# Patient Record
Sex: FEMALE | HISPANIC OR LATINO | Employment: UNEMPLOYED
[De-identification: names, ages, dates, MRNs, and addresses within clinical notes are randomized per-mention and may not be internally consistent; named-entity substitution may affect disease eponyms.]

---

## 2017-06-08 ENCOUNTER — RECORDS - HEALTHEAST (OUTPATIENT)
Dept: ADMINISTRATIVE | Facility: OTHER | Age: 42
End: 2017-06-08

## 2017-10-04 ENCOUNTER — COMMUNICATION - HEALTHEAST (OUTPATIENT)
Dept: NEUROSURGERY | Facility: CLINIC | Age: 42
End: 2017-10-04

## 2017-10-05 ENCOUNTER — RECORDS - HEALTHEAST (OUTPATIENT)
Dept: ADMINISTRATIVE | Facility: OTHER | Age: 42
End: 2017-10-05

## 2017-10-05 ENCOUNTER — AMBULATORY - HEALTHEAST (OUTPATIENT)
Dept: NEUROSURGERY | Facility: CLINIC | Age: 42
End: 2017-10-05

## 2017-10-06 ENCOUNTER — OFFICE VISIT - HEALTHEAST (OUTPATIENT)
Dept: NEUROSURGERY | Facility: CLINIC | Age: 42
End: 2017-10-06

## 2017-10-06 DIAGNOSIS — R90.0 INTRACRANIAL MASS: ICD-10-CM

## 2017-10-06 RX ORDER — IBUPROFEN 600 MG/1
600 TABLET, FILM COATED ORAL EVERY 6 HOURS PRN
Status: SHIPPED | COMMUNITY
Start: 2017-10-06

## 2017-10-06 RX ORDER — ACETAMINOPHEN 500 MG
500 TABLET ORAL EVERY 6 HOURS PRN
Status: SHIPPED | COMMUNITY
Start: 2017-10-06

## 2017-10-06 RX ORDER — SUMATRIPTAN 50 MG/1
50 TABLET, FILM COATED ORAL
Status: SHIPPED | COMMUNITY
Start: 2017-10-06

## 2017-10-06 RX ORDER — CHLORAL HYDRATE 500 MG
2 CAPSULE ORAL 2 TIMES DAILY
Status: SHIPPED | COMMUNITY
Start: 2017-10-06

## 2017-10-06 RX ORDER — FOLIC ACID 1 MG/1
1 TABLET ORAL DAILY
Status: SHIPPED | COMMUNITY
Start: 2017-10-06

## 2017-10-06 RX ORDER — VALACYCLOVIR HYDROCHLORIDE 1 G/1
1000 TABLET, FILM COATED ORAL DAILY
Status: SHIPPED | COMMUNITY
Start: 2017-10-06

## 2017-10-06 RX ORDER — LEVOTHYROXINE SODIUM 125 UG/1
125 TABLET ORAL DAILY
Status: SHIPPED | COMMUNITY
Start: 2017-10-06

## 2017-10-06 ASSESSMENT — MIFFLIN-ST. JEOR: SCORE: 1402.8

## 2017-10-30 ENCOUNTER — RECORDS - HEALTHEAST (OUTPATIENT)
Dept: ADMINISTRATIVE | Facility: OTHER | Age: 42
End: 2017-10-30

## 2017-10-30 ENCOUNTER — HOSPITAL ENCOUNTER (OUTPATIENT)
Dept: MRI IMAGING | Facility: CLINIC | Age: 42
Discharge: HOME | End: 2017-10-30
Attending: NEUROLOGICAL SURGERY

## 2017-10-30 DIAGNOSIS — R90.0 INTRACRANIAL MASS: ICD-10-CM

## 2017-11-16 ENCOUNTER — OFFICE VISIT - HEALTHEAST (OUTPATIENT)
Dept: NEUROSURGERY | Facility: CLINIC | Age: 42
End: 2017-11-16

## 2017-11-16 DIAGNOSIS — D32.9 MENINGIOMA (H): ICD-10-CM

## 2017-11-16 DIAGNOSIS — G93.9 RIGHT PARIETAL LOBE LESION: ICD-10-CM

## 2017-11-29 ENCOUNTER — COMMUNICATION - HEALTHEAST (OUTPATIENT)
Dept: NEUROSURGERY | Facility: CLINIC | Age: 42
End: 2017-11-29

## 2018-05-10 ENCOUNTER — HOSPITAL ENCOUNTER (OUTPATIENT)
Dept: MRI IMAGING | Facility: CLINIC | Age: 43
Discharge: HOME | End: 2018-05-10
Attending: NEUROLOGICAL SURGERY

## 2018-05-10 DIAGNOSIS — D32.9 MENINGIOMA (H): ICD-10-CM

## 2018-05-10 DIAGNOSIS — G93.9 RIGHT PARIETAL LOBE LESION: ICD-10-CM

## 2018-07-09 ENCOUNTER — OFFICE VISIT - HEALTHEAST (OUTPATIENT)
Dept: NEUROSURGERY | Facility: CLINIC | Age: 43
End: 2018-07-09

## 2018-07-09 DIAGNOSIS — D32.9 MENINGIOMA (H): ICD-10-CM

## 2018-07-09 ASSESSMENT — MIFFLIN-ST. JEOR: SCORE: 1401.89

## 2018-12-11 ENCOUNTER — HOSPITAL ENCOUNTER (OUTPATIENT)
Dept: MRI IMAGING | Facility: CLINIC | Age: 43
Discharge: HOME | End: 2018-12-11
Attending: NEUROLOGICAL SURGERY

## 2018-12-11 DIAGNOSIS — D32.9 MENINGIOMA (H): ICD-10-CM

## 2018-12-20 ENCOUNTER — AMBULATORY - HEALTHEAST (OUTPATIENT)
Dept: NEUROSURGERY | Facility: CLINIC | Age: 43
End: 2018-12-20

## 2018-12-20 DIAGNOSIS — D32.9 MENINGIOMA (H): ICD-10-CM

## 2019-01-14 ENCOUNTER — COMMUNICATION - HEALTHEAST (OUTPATIENT)
Dept: NEUROSURGERY | Facility: CLINIC | Age: 44
End: 2019-01-14

## 2019-01-14 ENCOUNTER — AMBULATORY - HEALTHEAST (OUTPATIENT)
Dept: NEUROSURGERY | Facility: CLINIC | Age: 44
End: 2019-01-14

## 2019-01-14 DIAGNOSIS — G44.209 TENSION HEADACHE: ICD-10-CM

## 2019-11-15 ENCOUNTER — AMBULATORY - HEALTHEAST (OUTPATIENT)
Dept: NEUROSURGERY | Facility: CLINIC | Age: 44
End: 2019-11-15

## 2019-11-15 DIAGNOSIS — D32.9 MENINGIOMA (H): ICD-10-CM

## 2019-12-12 ENCOUNTER — HOSPITAL ENCOUNTER (OUTPATIENT)
Dept: MRI IMAGING | Facility: CLINIC | Age: 44
Discharge: HOME | End: 2019-12-12
Attending: NEUROLOGICAL SURGERY

## 2019-12-12 DIAGNOSIS — D32.9 MENINGIOMA (H): ICD-10-CM

## 2020-01-23 ENCOUNTER — OFFICE VISIT - HEALTHEAST (OUTPATIENT)
Dept: NEUROSURGERY | Facility: CLINIC | Age: 45
End: 2020-01-23

## 2020-01-23 DIAGNOSIS — D32.9 MENINGIOMA (H): ICD-10-CM

## 2020-01-23 ASSESSMENT — MIFFLIN-ST. JEOR: SCORE: 1401.89

## 2021-05-31 VITALS — HEIGHT: 59 IN | WEIGHT: 189.2 LBS | BODY MASS INDEX: 38.14 KG/M2

## 2021-06-01 VITALS — HEIGHT: 59 IN | WEIGHT: 189 LBS | BODY MASS INDEX: 38.1 KG/M2

## 2021-06-04 VITALS
WEIGHT: 189 LBS | BODY MASS INDEX: 38.1 KG/M2 | OXYGEN SATURATION: 97 % | HEIGHT: 59 IN | DIASTOLIC BLOOD PRESSURE: 57 MMHG | SYSTOLIC BLOOD PRESSURE: 109 MMHG | HEART RATE: 69 BPM

## 2021-06-05 NOTE — PROGRESS NOTES
Judi is here for her yearly MRI follow up of her head. She is being followed for a Meningioma. She states her symptoms are still unchanged. She c/o HA, right side facial sensation and numbness in right arm.   POONAM Johnson

## 2021-06-05 NOTE — PROGRESS NOTES
Neurosurgery Follow Up Note  01/23/20     A/P: Judi Bush is a 44 y.o. female who presents for follow-up today regarding known right parietal meningioma.  Additionally she has an area of stable FLAIR signal change (possible hamartoma versus cortical dysplasia) within the right parietal gyrus that is lateral to the meningioma.  She has symptoms of headache which are unrelated to her meningioma.    Stable MRI.  Recommend repeat brain MRI in 18 months.  Call sooner if any worsening or changing symptoms    S:     Since our last visit her symptoms have remained unchanged.  She continues to have headaches but notes they are less frequent.  Her headaches are located on the right side of her head which she characterizes as a throbbing sensation behind her right eye.  The pain will radiate towards the back of her right head.  She notes her headaches worsen with sound and she will have associated.  Her headaches have caused her to vomit in the past.        PMH: No interval change  PSH: No interval change    ROS: Headaches. A full 14 point review of systems was otherwise completed and is negative aside from that mentioned above    O:  Vitals:    01/23/20 1509   BP: 109/57   Pulse: 69   SpO2: 97%     General: Awake, alert, NAD  HEENT: AT/NC, EOMI, face symmetric, oral mucosa moist and pink  Heart: RRR  Lungs: Nonlabored respirations without accessory muscle use.  Neuro: Awake, alert, speech is clear and content is appropriate. MAEW x 4 with full strength in b/l UE and LE. Sensation is intact to temperature and LT. Vibratory sense is intact in the bl great toe  Coordination: Gait is WNL. Patient is able to heel and toe walk without difficulty. Tandem gait is WNL.  Reflexes: 2+ biceps, triceps, brachioradialis. 2+ patellar and achilles. No clonus. Toes downgoing bilaterally.  Musculoskeletal: Negative straight leg raise bl  Psych: Appropriate mood and affect, pleasant, cooperative, alert and oriented x 3  Skin: No  obvious rash or lesion    I personally reviewed and visualized the following radiographic images:  Brain MRI 12/12/2018: Stable right parietal convexity meningioma appears grossly stable compared to her last MRI performed in October 2018.  Stable T2 flair signal change with the adjacent posterior parietal lobe may represent cortical dysplasia or other lesion such as hamartoma.    Jennifer Winetrs MD    I, Ochoa Crump, am serving as a scribe to document services personally performed by Dr. Jennifer Winters MD, based on my observation and the provider's statements to me.   I, Jennifer Winters MD attest that Ochoa Crump is acting in a scribe capacity, has observed my performance of the services and has documented them in accordance with my direction.

## 2021-06-13 NOTE — PROGRESS NOTES
Nerosurgery consultation was requested by: AGATHA Spence for a Brain tumor   Pt c/o numbness on right side of face and is constant. She has had numbness since 5/2017.she also gets some numbness in right arm and weakness in left arm.  She also c/o HA on right side. HA are constant and daily. Judi thinks she has some troubles with memories. She has had 2 episodes while driving and spacing out and then not knowing where she is going.   Elizabeht,CMA

## 2021-06-13 NOTE — PROGRESS NOTES
NEUROSURGERY CONSULTATION NOTE    10/17/2017     CHIEF COMPLAINT: Extra-axial intracranial mass    Judi Bush is a 42 y.o. female who is sent to us in consultation by Mary Elmore  for further evaluation of an extra-axial mass concerning for meningioma, second opinion     HPI:    Judi Bush is a very pleasant 42-year-old Sudanese-speaking female who presents today for second opinion regarding treatment of a recently identified extra-axial mass.  She notes that for the last several years she has been experiencing intermittent headaches that have been difficult to treat.  She describes them as bifrontal in location, pulsatile in character, and notes that they worsen in an upright position.  She notes that when she has a headache she becomes photophobic, nauseated, and she states that when she is trying to read with a headache she has to have the item very close to her and it causes her pain.  She describes her headaches as constant although she notes that they will intermittently resolved with Advil and Tylenol has been known to cause improvement.  She notes that she had a change in character of her headaches approximately 5-6 months ago when she began experiencing a pain in the posterior aspect of her right head.  She reports right facial numbness that radiates along the jawline which has been present with her headaches since May 2017.  She reports intermittent right arm numbness during this period as well which is becoming more frequent; when questioned specifically about this symptom, she notes it occurs only at night and notices it upon awakening and it will spontaneously resolve, she never experiences this during the daytime.    In light of these recent symptoms over the last 5 months, Judi underwent brain MRI on 5/25/2017 for further evaluation of her headaches and she was ultimately found to have a right posterior parietal extra-axial mass that appears most consistent with meningioma.   She notes that she was seen in consultation at [facility] where they recommended she undergo surgical resection.  She notes that the surgery was scheduled for the end of September; however, when it was found she has no insurance her case was postponed indefinitely.  Judi is very nervous and she believes that her facial and arm numbness are related to this mass.  She additionally notes that over the last 8 days she has found herself forgetful and has gotten lost while driving.  She is worried that if the mass is left in place and will continue to grow and bad things will happen.  She presents to clinic today for second opinion regarding treatment of the extra-axial mass.  A Hebrew speaking  was present to assist with the history and physical discussion regarding further plan.    Past Medical History:   Diagnosis Date     Brain tumor      Cancer     thyroid     Herpes      Papillary thyroid carcinoma 2011 Lymph nodes positive; T1, N1a, M0 Stage I (no mets)     Past Surgical History:   Procedure Laterality Date      SECTION       THYROIDECTOMY  2012         REVIEW OF SYSTEMS:  Patient reports left arm pain that begins at the shoulder and radiates to the elbow and forearm.  She perceives weakness in this arm.  She notes the symptoms have been present for 1 year, with no inciting factor, no significant change during that time, and is not associated with any numbness or tingling.  She denies any other perceived weakness, changes in her speech, difficulty walking or incoordination.  A full 14 point review of systems was otherwise completed and is negative aside from that mentioned above in the HPI    MEDICATIONS:    Current Outpatient Prescriptions   Medication Sig Dispense Refill     acetaminophen (TYLENOL) 500 MG tablet Take 500 mg by mouth every 6 (six) hours as needed for pain.       calcium carbonate-vitamin D3 (CALCIUM 600 WITH VITAMIN D3) 600 mg(1,500mg) -400 unit Chew Chew.    "    cholecalciferol, vitamin D3, (CHOLECALCIFEROL) 1,000 unit tablet Take 2,000 Units by mouth daily.       folic acid (FOLVITE) 1 MG tablet Take 1 mg by mouth daily.       ibuprofen (ADVIL,MOTRIN) 600 MG tablet Take 600 mg by mouth every 6 (six) hours as needed for pain.       levothyroxine (SYNTHROID, LEVOTHROID) 125 MCG tablet Take 125 mcg by mouth daily.       OMEGA-3/DHA/EPA/FISH OIL (FISH OIL-OMEGA-3 FATTY ACIDS) 300-1,000 mg capsule Take 2 g by mouth 2 (two) times a day.       psyllium (METAMUCIL) 3.4 gram packet Take 1 packet by mouth daily.       SUMAtriptan (IMITREX) 50 MG tablet Take 50 mg by mouth every 2 (two) hours as needed for migraine.       valACYclovir (VALTREX) 1000 MG tablet Take 1,000 mg by mouth daily.       No current facility-administered medications for this visit.          ALLERGIES/SENSITIVITIES:     No Known Allergies    PERTINENT SOCIAL HISTORY:   Social History     Social History     Marital status:      Spouse name: N/A     Number of children: N/A     Years of education: N/A     Social History Main Topics     Smoking status: Never Smoker     Smokeless tobacco: Never Used     Alcohol use No     Drug use: No     Sexual activity: Not Asked     Other Topics Concern     None     Social History Narrative         FAMILY HISTORY:  Family History   Problem Relation Age of Onset     Brain cancer Mother      Glioblastoma     Heart disease Father         PHYSICAL EXAM:   Constitutional: /70  Pulse 78  Ht 4' 10.62\" (1.489 m)  Wt 189 lb 3.2 oz (85.8 kg)  SpO2 98%  BMI 38.71 kg/m2     Mental Status: A & O in no acute distress.  Affect is appropriate.  Speech is fluent.  Recent and remote memory are intact.  Attention span and concentration are normal.     Cranial Nerves: CN1: grossly intact per patient recall. CN2: No funduscopic exam performed. CN3,4 & 6: Pupillary light response, lateral and vertical gaze normal.  No nystagmus.  Visual fields are full to confrontation. CN5: " Intact to touch CN7: No facial weakness, smile, facial symmetry intact. CN8: Intact to spoken voice. CN9&10: Gag reflex, uvula midline, palate rises with phonation. CN11: Shoulder shrug 5/5 intact bilaterally. CN12: Tongue midline and moves freely from side to side.     Motor: No pronator drift of upper extremity. Normal bulk and tone all muscle groups of upper and lower extremities.  Strength is 5/5 in all muscle groups the upper and lower extremities     Sensory: Sensation is intact to temperature, light touch, pinprick in all 4 extremities as well as the bilateral face.     Coordination: Gait is normal.  Patient is able to perform tandem gait without difficulty.  She is able to heel and toe walk.  Finger to nose movements are within normal limits.  Fine motor movements of the upper extremities are within normal limits.     Reflexes; 2+ supinator, biceps, triceps, knee/ ankle jerk intact.  No Hoffmans/toes are downgoing in response to plantar simulation bilaterally/no clonus.    IMAGING: I personally reviewed all radiographic images    Brain MRI 5/25/2017: there is a contrast-enhancing extra-axial mass along the right parietal occipital convexity that measures 25 mm in greatest dimension with minimal mass-effect.  There is very minimal FLAIR signal change in the lateral, anterior adjacent gyrus.  There is no other significant intracranial mass, abnormal contrast enhancement, mass-effect.  Ventricles are of normal size.  There is mild cerebellar tonsillar ectopia that appears within normal limits.    CONSULTATION ASSESSMENT AND PLAN:    Judi Bush is a 42-year-old right-handed Macedonian speaking female who has a posterior right parietal extra-axial mass measuring 2.5 cm in greatest dimension which appears most consistent with meningioma.    I had a long discussion with Judi and her friend Mena Marquez ([phone]) today in clinic regarding her images as well as her chronic symptoms.  I explained that her  extra-axial mass has all of the classic characteristics of a meningioma which is otherwise a slow growing, benign tumor.  I explained that surgical is the primary treatment option for this type of tumor, however her masses currently of a size where it could certainly be observed.  I emphasized that resection would not alleviate her chronic headaches.  I additionally explained that the right-sided parietal mass cannot be causing her right-sided facial and arm numbness and we reviewed this anatomy.  I did explain that there are other possible diagnoses for an extra-axial mass, one of which is metastatic cancer.  Judi does have a history of thyroid cancer which was found in one lymph node but according to her endocrinology notes she has had no evidence of metastatic disease. Furthermore, the imaging characteristics for this lesion do not otherwise suggest metastasis.  However, in light of the fact that her last brain MRI was performed in May 2017, I recommend that she undergo repeat brain MRI with and without contrast at this point in time so that we can demonstrate stability of the mass.  If there has been no interval change in the mass or the adjacent brain, I believe this is something that we can continue to watch conservatively given its current size and location.    I spent more than 60 minutes in this apt, examining the pt, reviewing the scans, reviewing notes from chart, discussing treatment options with risks and benefits and coordinating care. >50 % clinic time was spent in face to face counseling and coordinating care    Jennifer Tompkins MD      Cc:   Gricel Cameron MD  [address] [place] [id number]

## 2021-06-14 NOTE — PROGRESS NOTES
Judi Bush was last seen on 10/6/2017 for her right facial numbness associated with extra-axial meningioma.  Today she returns with an updated brain MRI scan. She is accompanied by her family and . She denies any changes of symptoms in the interim. Gait and balance are normal. Speech is fluent. Cognition is intact.  Nichelle Stevens RN, CNRN

## 2021-06-14 NOTE — PROGRESS NOTES
Neurosurgery Progress Note    A/P: Judi Bush is a 42-year-old right-handed Taiwanese speaking female who has a posterior right parietal extra-axial mass measuring 2.5 cm, most consistent w meningioma. Additionally noted to have an area of stable FLAIR signal change in the right parietal gyrus that is lateral to the meningoma; unchanged since original imaging    I reviewed Judi's repeat brain MRI with her today in clinic.  I noted that overall her meningioma in the right parietal region is stable with a significant increase in size.  I noted that the associated FLAIR signal change with this mass is also stable.  I again emphasized that he do not believe that this mass was the cause of her headaches were associated symptoms including right facial numbness.  Additionally, I noted that on her previous MRI was another area of right parietal FLAIR signal that was in a lateral nearby gyrus.  This same flair signal abnormality is again demonstrated on her most recent MRI.  There is no significant interval increase in size or change.  This area could represent cortical dysplasia or alternatively low-grade intrinsic brain mass.    I recommended that Judi undergo repeat MRI in another 6 months to ensure her lesions are stable, at which point we may be able to increase her observation period.  She is in agreement with this plan.  Additionally she would like to fax her office forms regarding insurance coverage for her serial monitoring and workup.  I noted that be happy to fill out anything she required.    S: Judi denies any interval changes since she was seen last.  She continues to have headaches with a throbbing character over posterior right aspect of her head.  She denies any new numbness, tingling, weakness.  She denies any other new symptoms.    O:  Vitals:    11/16/17 1542   BP: 112/70   Pulse: 68   Resp: 16     General: Awake, alert, NAD  Heart: RRR  Lungs: Nonlabored respirations without accessory muscle  use.  Abd: S, NT, ND  Neuro: Awake, alert, speech is clear and content is appropriate.  Cranial nerves II through XII are grossly intact.  There is no extinction with double simultaneous stimulation.  No difficulty with left right discrimination.  MAEW x 4 with full strength in all muscle groups of the b/l UE and LE. Sensation is intact to temperature and LT. No drift. No clonus. Toes downgoing bilaterally.  No Marilee.  Deep tendon reflexes are 3+ and symmetric throughout bilateral lower and upper extremities.    Jennifer Tompkins MD  11/16/17

## 2021-06-19 NOTE — PROGRESS NOTES
Judi is here for her follow up MRI of head. She states that she still has HA, and a sensation on right side of face, and c/o right arm numbness.   Elizabeth,CMA

## 2021-06-19 NOTE — PROGRESS NOTES
Neurosurgery Progress Note  07/09/18    A/P: Judi Bush is a 43 y.o. female was a posterior right parietal extra-axial mass measuring 2.5 cm, most consistent with meningioma.  Additionally she has an area of stable FLAIR signal change (possible hamartoma versus cortical dysplasia) within the right parietal gyrus that is lateral to the meningioma, unchanged since original imaging.    I reviewed process repeat brain MRI with her today in clinic.  I noted that overall the flair signal changes within the brain, as well as the meningioma itself appear consistent and stable over time.  There have been no significant interval changes.  I again emphasized that I do not believe that this mass is the cause of her headaches are associated symptoms including a right facial and arm numbness.    I recommend repeat brain MRI in 6 months to ensure her lesions are stable, and at that point in time we can increase to likely 1 year observational point.    S: No significant interval change since last seen.  Judi continues to experience headaches on the right side of her head.  She notes she gets associated right upper extremity numbness, which only occurs with headache.  She also gets right-sided facial numbness with this as well.  For her headaches, she will take ibuprofen as needed, she does not take any daily medications for headache.  She notes sometimes and associated fatigue or tiredness.  Also with headache she will intermittently get nausea, but no emesis.    PMH: No interval change  PSH: No interval change    ROS: Rested notes that intermittently with her headaches, she will sometimes get a sour taste in the mouth.  She denies any changes in her vision with headaches.  Resident notes that after she has been on her feet for 4-6 hours, she will experience intermittent low back pain as well as aching and pain in the bilateral lower extremities, primarily from the knees down to the feet.  A full 14 point review of  systems was otherwise completed and is negative aside from that mentioned above in the HPI    O:  Vitals:    07/09/18 0938   BP: 107/67   Pulse: 61   SpO2: 96%     General: Awake, alert, NAD  HEENT: AT/NC, EOMI, face symmetric, oral mucosa moist and pink  Heart: RRR  Lungs: Nonlabored respirations without accessory muscle use.  Abd: S, NT, ND  Neuro: Awake, alert, speech is clear and content is appropriate. MAEW x 4 with full strength in b/l UE and LE. Sensation is intact to temperature and LT. No drift.  Cranial nerves II through XII are grossly intact  Coordination: Rapid alternating movements in the upper extremities are normal in speed and amplitude.  Gait is normal.  Patient is able to heel and toe walk without difficulty.  Tandem gait is within normal limits.  Reflexes: 2+ bilateral patellar and Achilles reflexes bilaterally.  2 beats of clonus bilaterally.  Toes downgoing bilaterally.  Musculoskeletal: Negative straight leg raise bilaterally.    Psych: Appropriate mood and affect, pleasant, cooperative, alert and oriented x 3  Skin: No obvious rash or lesion.    I personally reviewed and visualized the following radiographic images:    Brain MRI 10/2018: There is a stable right parietal convexity extra-axial mass consistent with meningioma which appears grossly stable compared to her last scan performed in October 2017.  There is additional persistent T2/flair signal within the adjacent posterior parietal lobe and may represent hamartoma or cortical dysplasia, this is also unchanged compared to prior imaging.    Jennifer Tompkins MD  07/09/18

## 2021-06-22 NOTE — PROGRESS NOTES
Left a detailed message through :   meningioma continues to be stable, unchanged in size.  offered her either:   A. To come into clinic if she wants to look at these pictures together with Dr. Tompkins  OR   B. To come into clinic in 1 year with a repeat MRI (with and withou contrast would need to be ordered) which is the next time Dr. Tompkins would do a check .  Nichelle Stevens RN, CNRN

## 2021-06-23 NOTE — TELEPHONE ENCOUNTER
Called pt because she missed apt today. Informed her through  that her MRI is unchanged and that Dr. Jacques did not need to see her in clinic and that we would just do another MRI in one year. Pt states she HA , chest palpitations, and fatigue. She states she has had symptoms for about 2 months. Per CNP pt needs to f/u with primary for palpitations and fatigue and will put in a consult to neurology for HA. Pt satisfied with plan.   Telly,CMA

## 2021-07-13 ENCOUNTER — APPOINTMENT (OUTPATIENT)
Dept: INTERPRETER SERVICES | Facility: CLINIC | Age: 46
End: 2021-07-13

## 2021-07-17 ENCOUNTER — HOSPITAL ENCOUNTER (OUTPATIENT)
Dept: MRI IMAGING | Facility: HOSPITAL | Age: 46
Discharge: HOME | End: 2021-07-17
Attending: NEUROLOGICAL SURGERY | Admitting: NEUROLOGICAL SURGERY

## 2021-07-17 DIAGNOSIS — D32.9 MENINGIOMA (H): ICD-10-CM

## 2021-07-17 PROCEDURE — 70553 MRI BRAIN STEM W/O & W/DYE: CPT

## 2021-07-17 PROCEDURE — 255N000002 HC RX 255 OP 636: Performed by: NEUROLOGICAL SURGERY

## 2021-07-17 PROCEDURE — A9585 GADOBUTROL INJECTION: HCPCS | Performed by: NEUROLOGICAL SURGERY

## 2021-07-17 RX ORDER — GADOBUTROL 604.72 MG/ML
9 INJECTION INTRAVENOUS ONCE
Status: COMPLETED | OUTPATIENT
Start: 2021-07-17 | End: 2021-07-17

## 2021-07-17 RX ADMIN — GADOBUTROL 9 ML: 604.72 INJECTION INTRAVENOUS at 17:07

## 2021-07-19 ENCOUNTER — TELEPHONE (OUTPATIENT)
Dept: NEUROSURGERY | Facility: CLINIC | Age: 46
End: 2021-07-19

## 2021-07-19 DIAGNOSIS — D32.9 MENINGIOMA (H): Primary | ICD-10-CM

## 2021-07-19 NOTE — TELEPHONE ENCOUNTER
Called and let Judi know Dr. Winters reviewed her recent MRI and it looks stable. Will repeat scan in 18 months. Judi verbalized satisfaction.  Nichelle Stevens RN, CNRN

## 2022-05-13 ENCOUNTER — TRANSFERRED RECORDS (OUTPATIENT)
Dept: HEALTH INFORMATION MANAGEMENT | Facility: CLINIC | Age: 47
End: 2022-05-13

## 2022-05-20 ENCOUNTER — TELEPHONE (OUTPATIENT)
Dept: DERMATOLOGY | Facility: CLINIC | Age: 47
End: 2022-05-20

## 2022-05-20 ENCOUNTER — TRANSCRIBE ORDERS (OUTPATIENT)
Dept: OTHER | Age: 47
End: 2022-05-20

## 2022-05-20 DIAGNOSIS — C44.91 BASAL CELL CARCINOMA: Primary | ICD-10-CM

## 2022-05-20 NOTE — TELEPHONE ENCOUNTER
[facility] Call Center    Phone Message    May a detailed message be left on voicemail: yes     Reason for Call: Other: Hi, please review and schedule urgent referral from 5/20 Dr. Andrey Jackson @ [facility]. The referral says BCC on shave bx of left scapular lesion, eval for ED&C vs other therapy, please schedule ASAP. Thanks     Action Taken: Message routed to:  Other: [facility] dermatology    Travel Screening: Not Applicable

## 2022-05-23 NOTE — TELEPHONE ENCOUNTER
Attempt #1    Called patient using [facility]  interpretor [phone]    Unable to leave a message- no answering machine:  Patient needs her pathology results faxed to [phone] prior to scheduling appointment with Dr. Carrillo    Thank you,    Nevaeh FISCHERRN BSN  [facility] Dermatology- [phone]

## 2022-05-25 ENCOUNTER — APPOINTMENT (OUTPATIENT)
Dept: INTERPRETER SERVICES | Facility: CLINIC | Age: 47
End: 2022-05-25

## 2022-05-25 NOTE — TELEPHONE ENCOUNTER
Spoke with pt through [facility] interpretor. Pt is going to call her clinic and have her pathology results faxed to [phone].     Abigail MCGARRY RN  Dermatology   [phone]

## 2022-05-26 ENCOUNTER — APPOINTMENT (OUTPATIENT)
Dept: INTERPRETER SERVICES | Facility: CLINIC | Age: 47
End: 2022-05-26

## 2022-05-27 NOTE — TELEPHONE ENCOUNTER
Clinic MA called and spoke with pt after results were received by Dr. Mak and scheduled Mohs for 8/11.     Abigail MCGARRY RN  Dermatology   [phone]

## 2022-08-10 NOTE — PROGRESS NOTES
Surgical Office Location:  [facility]  [address] [place] [id number]

## 2022-08-11 ENCOUNTER — OFFICE VISIT (OUTPATIENT)
Dept: DERMATOLOGY | Facility: CLINIC | Age: 47
End: 2022-08-11

## 2022-08-11 DIAGNOSIS — D23.9 DERMAL NEVUS: ICD-10-CM

## 2022-08-11 DIAGNOSIS — D18.01 ANGIOMA OF SKIN: ICD-10-CM

## 2022-08-11 DIAGNOSIS — L81.4 LENTIGO: ICD-10-CM

## 2022-08-11 DIAGNOSIS — C44.519 BASAL CELL CARCINOMA (BCC) OF BACK: Primary | ICD-10-CM

## 2022-08-11 DIAGNOSIS — L82.1 SEBORRHEIC KERATOSIS: ICD-10-CM

## 2022-08-11 PROCEDURE — 88331 PATH CONSLTJ SURG 1 BLK 1SPC: CPT | Performed by: DERMATOLOGY

## 2022-08-11 PROCEDURE — 99203 OFFICE O/P NEW LOW 30 MIN: CPT | Mod: 25 | Performed by: DERMATOLOGY

## 2022-08-11 PROCEDURE — 11602 EXC TR-EXT MAL+MARG 1.1-2 CM: CPT | Performed by: DERMATOLOGY

## 2022-08-11 NOTE — PROGRESS NOTES
Judi Bush , a 47 year old year old female patient, I was asked to see by Dr. Jackson for basal cell carcinoma on left back.  Patient states this has been present for not sure.  Patient reports the following symptoms:  new .  Patient reports the following previous treatments none.  Patient reports the following modifying factors none.  Associated symptoms: none.  Patient has no other skin complaints today.  Remainder of the HPI, Meds, PMH, Allergies, FH, and SH was reviewed in chart.      Past Medical History:   Diagnosis Date     Basal cell carcinoma      Brain tumor (H)      Cancer (H)     thyroid     Herpes      Papillary thyroid carcinoma (H) 2011 Lymph nodes positive; T1, N1a, M0 Stage I (no mets)       Past Surgical History:   Procedure Laterality Date      SECTION       THYROIDECTOMY  2012        Family History   Problem Relation Age of Onset     Brain Cancer Mother         Glioblastoma     Heart Disease Father        Social History     Socioeconomic History     Marital status:      Spouse name: Not on file     Number of children: Not on file     Years of education: Not on file     Highest education level: Not on file   Occupational History     Not on file   Tobacco Use     Smoking status: Never Smoker     Smokeless tobacco: Never Used   Substance and Sexual Activity     Alcohol use: No     Drug use: No     Sexual activity: Not on file   Other Topics Concern     Not on file   Social History Narrative     Not on file     Social Determinants of Health     Financial Resource Strain: Not on file   Food Insecurity: Not on file   Transportation Needs: Not on file   Physical Activity: Not on file   Stress: Not on file   Social Connections: Not on file   Intimate Partner Violence: Not on file   Housing Stability: Not on file       Outpatient Encounter Medications as of 2022   Medication Sig Dispense Refill     acetaminophen (TYLENOL) 500 MG tablet [ACETAMINOPHEN  (TYLENOL) 500 MG TABLET] Take 500 mg by mouth every 6 (six) hours as needed for pain.       calcium carbonate-vitamin D3 (CALCIUM 600 WITH VITAMIN D3) 600 mg(1,500mg) -400 unit Chew [CALCIUM CARBONATE-VITAMIN D3 (CALCIUM 600 WITH VITAMIN D3) 600 MG(1,500MG) -400 UNIT CHEW] Chew.       cholecalciferol, vitamin D3, (CHOLECALCIFEROL) 1,000 unit tablet [CHOLECALCIFEROL, VITAMIN D3, (CHOLECALCIFEROL) 1,000 UNIT TABLET] Take 2,000 Units by mouth daily.       folic acid (FOLVITE) 1 MG tablet [FOLIC ACID (FOLVITE) 1 MG TABLET] Take 1 mg by mouth daily.       ibuprofen (ADVIL,MOTRIN) 600 MG tablet [IBUPROFEN (ADVIL,MOTRIN) 600 MG TABLET] Take 600 mg by mouth every 6 (six) hours as needed for pain.       levothyroxine (SYNTHROID, LEVOTHROID) 125 MCG tablet [LEVOTHYROXINE (SYNTHROID, LEVOTHROID) 125 MCG TABLET] Take 125 mcg by mouth daily.       OMEGA-3/DHA/EPA/FISH OIL (FISH OIL-OMEGA-3 FATTY ACIDS) 300-1,000 mg capsule [OMEGA-3/DHA/EPA/FISH OIL (FISH OIL-OMEGA-3 FATTY ACIDS) 300-1,000 MG CAPSULE] Take 2 g by mouth 2 (two) times a day.       psyllium (METAMUCIL) 3.4 gram packet [PSYLLIUM (METAMUCIL) 3.4 GRAM PACKET] Take 1 packet by mouth daily.       SUMAtriptan (IMITREX) 50 MG tablet [SUMATRIPTAN (IMITREX) 50 MG TABLET] Take 50 mg by mouth every 2 (two) hours as needed for migraine.       valACYclovir (VALTREX) 1000 MG tablet [VALACYCLOVIR (VALTREX) 1000 MG TABLET] Take 1,000 mg by mouth daily.       No facility-administered encounter medications on file as of 8/11/2022.             Review Of Systems  Skin: As above  Eyes: negative  Ears/Nose/Throat: negative  Respiratory: No shortness of breath, dyspnea on exertion, cough, or hemoptysis  Cardiovascular: negative  Gastrointestinal: negative  Genitourinary: negative  Musculoskeletal: negative  Neurologic: negative  Psychiatric: negative  Hematologic/Lymphatic/Immunologic: negative  Endocrine: negative      O:   NAD, WDWN, Alert & Oriented, Mood & Affect wnl, Vitals  stable   Here today with    General appearance edgardo iii   Vitals stable   Alert, oriented and in no acute distress   L upper back 1.1cm red plaque   Stuck on papules and brown macules on trunk and ext    Red papules on trunk   Flesh colored papules on trunk          Eyes: Conjunctivae/lids:Normal     ENT: Lips, buccal mucosa, tongue: normal    MSK:Normal    Cardiovascular: peripheral edema none    Pulm: Breathing Normal    Neuro/Psych: Orientation:Normal; Mood/Affect:Normal      MICRO:   L upper back:Unremarkable epidermis, dermis and superficial subcutis.  No concerning areas for malignancy.     A/P:  Visit done with    1. Basal cell carcinoma back   2. Seborrheic keratosis, lentigo, angioma, dermal nevus  It was a pleasure speaking to Judi Bush today.  Previous clinic  notes and pertinent laboratory tests were reviewed prior to Judi Bush's visit.  Signs and Symptoms of skin cancer discussed with patient.  Patient encouraged to perform monthly skin exams.  UV precautions reviewed with patient.  Risks of non-melanoma skin cancer discussed with patient   Return to clinic 6 months  PROCEDURE NOTE  L upper back basal cell carcinoma   EXCISION OF BASAL CELL CARCINOMA, Margins confirmed with FROZEN SECTIONS AND Second intent: After thorough discussion of PGACAC, consent obtained, anesthesia and prep, the margins of the lesion were identified and an incision was made encompassing the lesion with 3mm margin. The incisions were made through the skin and down to and including the superficial subcutis.  The lesion was removed en bloc and submitted for frozen section pathologic review. Clear margins obtained (1.7cm).    REPAIR SECOND INTENT: We discussed the options for wound management in full with the patient including risks/benefits/possible outcomes. Decision made to allow the wound to heal by second intention. EBL minimal; complications none; wound care routine.  The patient  was discharged in good condition and will return in one month or prn for wound evaluation.

## 2022-08-11 NOTE — PATIENT INSTRUCTIONS
Open Wound Care     for _left posterior scapula__    No strenuous activity for 48 hours    Take Tylenol as needed for discomfort.                                                .         Do not drink alcoholic beverages for 48 hours.    Keep the pressure bandage in place for 24 hours. If the bandage becomes blood tinged or loose, reinforce it with gauze and tape.        (Refer to the reverse side of this page for management of bleeding).    Remove bandage in 24 hours and begin wound care as follows:     Clean area with tap water using a Q tip or gauze pad, (shower / bathe normally)  Dry wound with Q tip or gauze pad  Apply Aquaphor, Vaseline, Polysporin or Bacitracin Ointment with a Q tip  Do NOT use Neosporin Ointment   Cover the wound with a band-aid or nonstick gauze pad and paper tape.  Repeat wound care once a day until wound is completely healed.    It is an old wives tale that a wound heals better when it is exposed to air and allowed to dry out. The wound will heal faster with a better cosmetic result if it is kept moist with ointment and covered with a bandage.  Do not let the wound dry out.    Supplies Needed:                Qtips or gauze pads                Vaseline, Aquaphor, Polysporin Ointment or Bacitracin Ointment (NOT NEOSPORIN)                Bandaids or nonstick gauze pads and paper tape    Wound care kits and brown paper tape are available for purchase at   the pharmacy.    BLEEDING:    Use tightly rolled up gauze or cloth to apply direct pressure over the bandage for 20   minutes.  Reapply pressure for an additional 20 minutes if necessary  Call the office or go to the nearest emergency room if pressure fails to stop the bleeding.  Use additional gauze and tape to maintain pressure once the bleeding has stopped.  Begin wound care 24 hours after surgery as directed.                WOUND HEALING    One week after surgery a pink / red halo will form around the outside of the wound.   This is new  skin.  The center of the wound will appear yellowish white and produce some drainage.  The pink halo will slowly migrate in toward the center of the wound until the wound is covered with new shiny pink skin.  There will be no more drainage when the wound is completely healed.  It will take six months to one year for the redness to fade.  The scar may be itchy, tight and sensitive to extreme temperatures for a year after the surgery.  Massaging the area several times a day for several minutes after the wound is completely healed will help the scar soften and normalize faster. Begin massage only after healing is complete.    In case of emergency call: Dr Mak: [phone]     [facility]: [phone]  [facility]: [phone]

## 2022-08-11 NOTE — LETTER
2022         RE: Judi Bush  [address] [place] [address]        Dear Colleague,    Thank you for referring your patient, Judi Bush, to the [facility]. Please see a copy of my visit note below.    Surgical Office Location:  [facility]  [address] [place] [id number]      Judi Bush , a 47 year old year old female patient, I was asked to see by Dr. Jackson for basal cell carcinoma on left back.  Patient states this has been present for not sure.  Patient reports the following symptoms:  new .  Patient reports the following previous treatments none.  Patient reports the following modifying factors none.  Associated symptoms: none.  Patient has no other skin complaints today.  Remainder of the HPI, Meds, PMH, Allergies, FH, and SH was reviewed in chart.      Past Medical History:   Diagnosis Date     Basal cell carcinoma      Brain tumor (H)      Cancer (H)     thyroid     Herpes      Papillary thyroid carcinoma (H) 2011    1/ Lymph nodes positive; T1, N1a, M0 Stage I (no mets)       Past Surgical History:   Procedure Laterality Date      SECTION       THYROIDECTOMY  2012        Family History   Problem Relation Age of Onset     Brain Cancer Mother         Glioblastoma     Heart Disease Father        Social History     Socioeconomic History     Marital status:      Spouse name: Not on file     Number of children: Not on file     Years of education: Not on file     Highest education level: Not on file   Occupational History     Not on file   Tobacco Use     Smoking status: Never Smoker     Smokeless tobacco: Never Used   Substance and Sexual Activity     Alcohol use: No     Drug use: No     Sexual activity: Not on file   Other Topics Concern     Not on file   Social History Narrative     Not on file     Social Determinants of Health     Financial Resource Strain: Not on file    Food Insecurity: Not on file   Transportation Needs: Not on file   Physical Activity: Not on file   Stress: Not on file   Social Connections: Not on file   Intimate Partner Violence: Not on file   Housing Stability: Not on file       Outpatient Encounter Medications as of 8/11/2022   Medication Sig Dispense Refill     acetaminophen (TYLENOL) 500 MG tablet [ACETAMINOPHEN (TYLENOL) 500 MG TABLET] Take 500 mg by mouth every 6 (six) hours as needed for pain.       calcium carbonate-vitamin D3 (CALCIUM 600 WITH VITAMIN D3) 600 mg(1,500mg) -400 unit Chew [CALCIUM CARBONATE-VITAMIN D3 (CALCIUM 600 WITH VITAMIN D3) 600 MG(1,500MG) -400 UNIT CHEW] Chew.       cholecalciferol, vitamin D3, (CHOLECALCIFEROL) 1,000 unit tablet [CHOLECALCIFEROL, VITAMIN D3, (CHOLECALCIFEROL) 1,000 UNIT TABLET] Take 2,000 Units by mouth daily.       folic acid (FOLVITE) 1 MG tablet [FOLIC ACID (FOLVITE) 1 MG TABLET] Take 1 mg by mouth daily.       ibuprofen (ADVIL,MOTRIN) 600 MG tablet [IBUPROFEN (ADVIL,MOTRIN) 600 MG TABLET] Take 600 mg by mouth every 6 (six) hours as needed for pain.       levothyroxine (SYNTHROID, LEVOTHROID) 125 MCG tablet [LEVOTHYROXINE (SYNTHROID, LEVOTHROID) 125 MCG TABLET] Take 125 mcg by mouth daily.       OMEGA-3/DHA/EPA/FISH OIL (FISH OIL-OMEGA-3 FATTY ACIDS) 300-1,000 mg capsule [OMEGA-3/DHA/EPA/FISH OIL (FISH OIL-OMEGA-3 FATTY ACIDS) 300-1,000 MG CAPSULE] Take 2 g by mouth 2 (two) times a day.       psyllium (METAMUCIL) 3.4 gram packet [PSYLLIUM (METAMUCIL) 3.4 GRAM PACKET] Take 1 packet by mouth daily.       SUMAtriptan (IMITREX) 50 MG tablet [SUMATRIPTAN (IMITREX) 50 MG TABLET] Take 50 mg by mouth every 2 (two) hours as needed for migraine.       valACYclovir (VALTREX) 1000 MG tablet [VALACYCLOVIR (VALTREX) 1000 MG TABLET] Take 1,000 mg by mouth daily.       No facility-administered encounter medications on file as of 8/11/2022.             Review Of Systems  Skin: As above  Eyes: negative  Ears/Nose/Throat:  negative  Respiratory: No shortness of breath, dyspnea on exertion, cough, or hemoptysis  Cardiovascular: negative  Gastrointestinal: negative  Genitourinary: negative  Musculoskeletal: negative  Neurologic: negative  Psychiatric: negative  Hematologic/Lymphatic/Immunologic: negative  Endocrine: negative      O:   NAD, WDWN, Alert & Oriented, Mood & Affect wnl, Vitals stable   Here today with    General appearance edgardo iii   Vitals stable   Alert, oriented and in no acute distress   L upper back 1.1cm red plaque   Stuck on papules and brown macules on trunk and ext    Red papules on trunk   Flesh colored papules on trunk          Eyes: Conjunctivae/lids:Normal     ENT: Lips, buccal mucosa, tongue: normal    MSK:Normal    Cardiovascular: peripheral edema none    Pulm: Breathing Normal    Neuro/Psych: Orientation:Normal; Mood/Affect:Normal      MICRO:   L upper back:Unremarkable epidermis, dermis and superficial subcutis.  No concerning areas for malignancy.     A/P:  Visit done with    1. Basal cell carcinoma back   2. Seborrheic keratosis, lentigo, angioma, dermal nevus  It was a pleasure speaking to Judi Bush today.  Previous clinic  notes and pertinent laboratory tests were reviewed prior to Judi Bush's visit.  Signs and Symptoms of skin cancer discussed with patient.  Patient encouraged to perform monthly skin exams.  UV precautions reviewed with patient.  Risks of non-melanoma skin cancer discussed with patient   Return to clinic 6 months  PROCEDURE NOTE  L upper back basal cell carcinoma   EXCISION OF BASAL CELL CARCINOMA, Margins confirmed with FROZEN SECTIONS AND Second intent: After thorough discussion of PGACAC, consent obtained, anesthesia and prep, the margins of the lesion were identified and an incision was made encompassing the lesion with 3mm margin. The incisions were made through the skin and down to and including the superficial subcutis.  The lesion  was removed en bloc and submitted for frozen section pathologic review. Clear margins obtained (1.7cm).    REPAIR SECOND INTENT: We discussed the options for wound management in full with the patient including risks/benefits/possible outcomes. Decision made to allow the wound to heal by second intention. EBL minimal; complications none; wound care routine.  The patient was discharged in good condition and will return in one month or prn for wound evaluation.           Again, thank you for allowing me to participate in the care of your patient.        Sincerely,        Gianluca Mak MD

## 2022-08-22 ENCOUNTER — TELEPHONE (OUTPATIENT)
Dept: DERMATOLOGY | Facility: CLINIC | Age: 47
End: 2022-08-22

## 2022-08-22 NOTE — TELEPHONE ENCOUNTER
Health Call Center    Phone Message    May a detailed message be left on voicemail: yes     Reason for Call: Symptoms or Concerns     If patient has red-flag symptoms, warm transfer to triage line    Current symptom or concern: Patient is calling requesting to have Dr. Mak/care team in regards to her Moh's procedure that was done on 08/11/22 stating that she doesn't think the incision area is healing. Patient states her incision site is painful and red.    Patient has concerns and would like to know what next steps to take.     Please advise.    Symptoms have been present for:  1.5 week(s)    Has patient previously been seen for this? Yes    By Dr. Mak: MOHS    Date: 08/11/2022    Are there any new or worsening symptoms? No      Action Taken: Message routed to:  Other: [facility]    Travel Screening: Not Applicable

## 2022-08-22 NOTE — TELEPHONE ENCOUNTER
Called pt with [facility] . Pt states the area where the Mohs was done is red and painful, denies foul drainage, or heat associated with the area. Advised these are normal symptoms associated with wound healing. Advised we can have her in for a nurse visit to look at the area. Nurse visit scheduled for 8/24.    Abigail MCGARRY RN  Dermatology   [phone]

## 2022-08-24 ENCOUNTER — TELEPHONE (OUTPATIENT)
Dept: DERMATOLOGY | Facility: CLINIC | Age: 47
End: 2022-08-24

## 2022-08-24 NOTE — TELEPHONE ENCOUNTER
Called and spoke with patient with [facility] intrepreter, she said the wound is feeling better and at this time does not need the nurse visit at this time. She said she would call back if she needs a visit at another time.      Abigail MCGARRY RN  Dermatology   [phone]

## 2022-08-24 NOTE — TELEPHONE ENCOUNTER
[facility] Call Center    Phone Message    May a detailed message be left on voicemail: yes     Reason for Call: Other: Pt needs to reschedule the nurse only wound check for today.    Writer canceled todays visit in [facility], please call back to reschedule.    Thank you   Action Taken: Message routed to:  [facility] derm    Travel Screening: Not Applicable

## 2022-10-19 ENCOUNTER — TELEPHONE (OUTPATIENT)
Dept: DERMATOLOGY | Facility: CLINIC | Age: 47
End: 2022-10-19

## 2022-10-19 NOTE — TELEPHONE ENCOUNTER
Was healing well. 3 weeks ago little redness and itching in area. Advised to continue using Vaseline. Booked with [facility] 10/27 to look at the site.    Abigail MCGARRY RN  Dermatology   [phone]

## 2022-10-19 NOTE — TELEPHONE ENCOUNTER
[facility] Call Center    Phone Message    May a detailed message be left on voicemail: yes     Reason for Call: Other: Patient called via [employer]  [id number] - Patient had MOHS surgery in August. Her wound site is still very red and itchy. Patient went to the ER yesterday. ER did not give patient any Rx and advised her to contact her dermatologist.    Please call back to discuss.  Thank you    Action Taken: Other: [place] [facility]    Travel Screening: Not Applicable